# Patient Record
Sex: FEMALE | Race: BLACK OR AFRICAN AMERICAN | Employment: UNEMPLOYED | URBAN - METROPOLITAN AREA
[De-identification: names, ages, dates, MRNs, and addresses within clinical notes are randomized per-mention and may not be internally consistent; named-entity substitution may affect disease eponyms.]

---
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
## 2025-01-05 DIAGNOSIS — E11.65 TYPE 2 DIABETES MELLITUS WITH HYPERGLYCEMIA, WITH LONG-TERM CURRENT USE OF INSULIN (HCC): ICD-10-CM

## 2025-01-05 DIAGNOSIS — Z79.4 TYPE 2 DIABETES MELLITUS WITH HYPERGLYCEMIA, WITH LONG-TERM CURRENT USE OF INSULIN (HCC): ICD-10-CM

## 2025-01-06 RX ORDER — INSULIN GLARGINE 100 [IU]/ML
INJECTION, SOLUTION SUBCUTANEOUS
Qty: 15 ML | Refills: 1 | Status: SHIPPED | OUTPATIENT
Start: 2025-01-06 | End: 2025-01-08

## 2025-01-08 DIAGNOSIS — Z79.4 TYPE 2 DIABETES MELLITUS WITH HYPERGLYCEMIA, WITH LONG-TERM CURRENT USE OF INSULIN (HCC): ICD-10-CM

## 2025-01-08 DIAGNOSIS — E11.65 TYPE 2 DIABETES MELLITUS WITH HYPERGLYCEMIA, WITH LONG-TERM CURRENT USE OF INSULIN (HCC): ICD-10-CM

## 2025-01-08 RX ORDER — INSULIN GLARGINE 100 [IU]/ML
INJECTION, SOLUTION SUBCUTANEOUS
Qty: 15 ML | Refills: 1 | Status: SHIPPED | OUTPATIENT
Start: 2025-01-08

## 2025-02-23 DIAGNOSIS — Z79.4 TYPE 2 DIABETES MELLITUS WITH HYPERGLYCEMIA, WITH LONG-TERM CURRENT USE OF INSULIN (HCC): ICD-10-CM

## 2025-02-23 DIAGNOSIS — E11.65 TYPE 2 DIABETES MELLITUS WITH HYPERGLYCEMIA, WITH LONG-TERM CURRENT USE OF INSULIN (HCC): ICD-10-CM

## 2025-02-24 RX ORDER — URINE ACETONE TEST STRIPS
STRIP MISCELLANEOUS
Qty: 100 STRIP | Refills: 2 | Status: SHIPPED | OUTPATIENT
Start: 2025-02-24 | End: 2025-02-25 | Stop reason: SDUPTHER

## 2025-02-24 RX ORDER — BLOOD SUGAR DIAGNOSTIC
STRIP MISCELLANEOUS
Qty: 200 STRIP | Refills: 2 | Status: SHIPPED | OUTPATIENT
Start: 2025-02-24 | End: 2025-02-25 | Stop reason: SDUPTHER

## 2025-02-25 ENCOUNTER — OFFICE VISIT (OUTPATIENT)
Age: 20
End: 2025-02-25
Payer: COMMERCIAL

## 2025-02-25 VITALS
HEIGHT: 65 IN | RESPIRATION RATE: 17 BRPM | DIASTOLIC BLOOD PRESSURE: 76 MMHG | OXYGEN SATURATION: 98 % | WEIGHT: 168.4 LBS | HEART RATE: 95 BPM | BODY MASS INDEX: 28.06 KG/M2 | TEMPERATURE: 98.1 F | SYSTOLIC BLOOD PRESSURE: 116 MMHG

## 2025-02-25 DIAGNOSIS — E11.65 TYPE 2 DIABETES MELLITUS WITH HYPERGLYCEMIA, WITH LONG-TERM CURRENT USE OF INSULIN (HCC): ICD-10-CM

## 2025-02-25 DIAGNOSIS — Z79.4 TYPE 2 DIABETES MELLITUS WITH HYPERGLYCEMIA, WITH LONG-TERM CURRENT USE OF INSULIN (HCC): ICD-10-CM

## 2025-02-25 DIAGNOSIS — E10.65 TYPE 1 DIABETES MELLITUS WITH HYPERGLYCEMIA (HCC): Primary | ICD-10-CM

## 2025-02-25 PROBLEM — E13.9 DIABETES MELLITUS DUE TO ABNORMAL INSULIN (HCC): Status: RESOLVED | Noted: 2022-02-23 | Resolved: 2025-02-25

## 2025-02-25 PROCEDURE — 99215 OFFICE O/P EST HI 40 MIN: CPT | Performed by: STUDENT IN AN ORGANIZED HEALTH CARE EDUCATION/TRAINING PROGRAM

## 2025-02-25 RX ORDER — BLOOD SUGAR DIAGNOSTIC
STRIP MISCELLANEOUS
Qty: 200 STRIP | Refills: 2 | Status: SHIPPED | OUTPATIENT
Start: 2025-02-25

## 2025-02-25 RX ORDER — URINE ACETONE TEST STRIPS
STRIP MISCELLANEOUS
Qty: 100 STRIP | Refills: 2 | Status: SHIPPED | OUTPATIENT
Start: 2025-02-25

## 2025-02-25 RX ORDER — INSULIN GLARGINE 100 [IU]/ML
INJECTION, SOLUTION SUBCUTANEOUS
Qty: 15 ML | Refills: 1 | Status: SHIPPED | OUTPATIENT
Start: 2025-02-25

## 2025-02-25 NOTE — PATIENT INSTRUCTIONS
Seen for follow for type 1 diabetes.  HbA1c today: due Target is <7.0%.           Plan  Importance of compliance reinforced   BG monitoring at least 4x/day  Send me numbers sarita week to review for any further insulin dose adjustment            Yearly eye exams are recommended   Dental exams every 6 months are recommended  Flu vaccine is recommended every year, as early in the season as possible  Medical ID should be worn at all times  Continue rotating injection/insertion sites  Annual labs are due: ordered today            New insulin regimen  Insulin Instructions  Fixed Dose Injections   Lantus SoloStar 100 UNIT/ML Sopn   Last edited by Marc Barragan MD on 2/25/2025 at 12:03 PM      Time of Day Dose (units)   Bedtime 35     Mealtime Injections   insulin lispro (1 Unit Dial) 100 UNIT/ML Sopn (HUMALOG/ADMELOG)   Last edited by Marc Barragan MD on 2/25/2025 at 12:03 PM      Humalog sliding scale with meals:     8 units  201-300   9 units  301-400   10 units  >400        11 units     Dose mealtime insulin prior to eating.    If low carb meals or no food, give 2 units of Humalog if blood sugar over 200 at mealtime    Continue metformin 1000mg twice daily with meals    Send BG levels via [employer] in 2 weeks

## 2025-02-25 NOTE — PROGRESS NOTES
Chief Complaint   Patient presents with    Follow-up     Patient being seen in clinic today for a follow up appt. Patient states she needs refills.

## 2025-02-25 NOTE — PROGRESS NOTES
[facility] Encounter with William Wheatley for follow up of Type 2 Diabetes- insulin dependent. Accompanied today by mom .      SHABBIR MORAGN RD, [facility]    Start time: 8:36 AM EST  End Time: 8:57 AM    Total time: 21 minutes spent with this clinician      Complete insulin delivery via: Multiple Daily Injections + metformin  Insights from device download: [employer] Meter download    Waking up 60-80 mg/dl many mornings, suggested lower Lantus dose to Dr Barragan    Very High (Above 250 mg/dL): 20 %  High (181-249 mg/dl): 23 %  Time in Range (  mg/dl): 51 %  Low (55- 69 mg/dl): 6 %  Very Low (Below 54 mg/dL): 1 %    TDD/TDI: 65 units       Insulin Instructions  Fixed Dose Injections   Lantus SoloStar 100 UNIT/ML Sopn   Last edited by Marc Barragan MD on 4/8/2024 at 10:18 AM      Time of Day Dose (units)   Bedtime 35     Mealtime Injections   insulin lispro (1 Unit Dial) 100 UNIT/ML Sopn (HUMALOG/ADMELOG)   Last edited by Marc Barragan MD on 8/1/2024 at 12:02 PM      Humalog sliding scale with meals:     8 units  201-300   9 units  301-400   10 units  >400        11 units        [x] Glucagon - GVOKE Reviewed how to use and ensure that they check the expiration date  [x] Ketones - new Rx today; discussed need to use with stress/illness/elevated blood sugar- less than 6 months old if opened. Need for home and school   [x] Injection sites  - POSTERIOR ARM and THIGH ; Rotations of these sites Yes  Signs of Overuse to same area or lipohypertrophy: No  [x] Carb counting skills assessed including resources used - not carb counting but taking 10 units Humalog regardless of BG level      Discussed the need for diabetes go bag that would include all diabetes management supplies, treatment for low.       Diagnosis date: 1/30/2018   Length of diabetes diagnosis: 7 years      DMMP completed: No - graduated, not currently working    Preparing for Adult Transition Checklist: Hypoglycemia    Patient expressed

## 2025-02-25 NOTE — PROGRESS NOTES
4/8/2024 CC: Follow up for type I diabetes [VICTORIA 65 positive] on insulin injection and metformin      Hx of depression/Anxiety    History of present illness:    William is a 19 y.o. 2 m.o. female who [facility] type [facility] diabetes. She was present today with her mum.     William was originally diagnosed with diabetes in 1/30/2018.  Her last visit in diabetes clinic was on 8/1/2024 hemoglobin A1c was 9.3 %. Since then, she has remained well with no intercurrent illnesses, ED visits, or hospitalizations. Denies headache,tiredness, problems with peripheral vision,constipation/diarrhea,heat/cold intolerance,polyuria,polydipsia      Blood glucoses: Meter download shows that she is checking her blood sugars on the average 4.4 times a day.  2-week blood sugar average: 175.  Time in range: 51%, high: 23%, very high: 20%, low: 6%, very low: 1%    Hypoglycemia: Blood 2 times a week.    Severe hypoglycemia requiring glucagon: None  Blood sugars about 300: 2-3 times a week.  Negative ketones.    Insulin regimen:  Insulin Instructions  Fixed Dose Injections   Lantus SoloStar 100 UNIT/ML Sopn   Last edited by Marc Barragan MD on 4/8/2024 at 10:18 AM      Time of Day Dose (units)   Bedtime 35     Mealtime Injections   insulin lispro (1 Unit Dial) 100 UNIT/ML Sopn (HUMALOG/ADMELOG)   Last edited by Marc Barragan MD on 8/1/2024 at 12:02 PM      Humalog sliding scale with meals:     8 units  201-300   9 units  301-400   10 units  >400        11 units          Also on metformin 1000mg BID with meals.       Last Eye exam: 5/6/2021 [no mention of diabetic retinopathy]    Last flu shot: Due    Medical ID: Does not wear  Screening labs:  TSH   Date Value Ref Range Status   11/23/2021 1.47 0.36 - 3.74 uIU/mL Final     Comment:       Due to TSH heterogeneity, both structurally and degree of glycosylation,  monoclonal antibodies used in the TSH assay may not accurately quantitate TSH.  Therefore,

## 2025-04-18 DIAGNOSIS — E11.65 TYPE 2 DIABETES MELLITUS WITH HYPERGLYCEMIA, WITH LONG-TERM CURRENT USE OF INSULIN (HCC): ICD-10-CM

## 2025-04-18 DIAGNOSIS — Z79.4 TYPE 2 DIABETES MELLITUS WITH HYPERGLYCEMIA, WITH LONG-TERM CURRENT USE OF INSULIN (HCC): ICD-10-CM

## 2025-04-18 RX ORDER — INSULIN GLARGINE 100 [IU]/ML
INJECTION, SOLUTION SUBCUTANEOUS
Qty: 15 ML | Refills: 1 | Status: SHIPPED | OUTPATIENT
Start: 2025-04-18

## 2025-04-18 RX ORDER — GLUCAGON INJECTION, SOLUTION 1 MG/.2ML
INJECTION, SOLUTION SUBCUTANEOUS
Qty: 1 EACH | Refills: 0 | Status: SHIPPED | OUTPATIENT
Start: 2025-04-18

## 2025-04-18 RX ORDER — PEN NEEDLE, DIABETIC 32GX 5/32"
NEEDLE, DISPOSABLE MISCELLANEOUS
Qty: 200 EACH | Refills: 3 | OUTPATIENT
Start: 2025-04-18

## 2025-04-18 RX ORDER — INSULIN LISPRO 100 [IU]/ML
INJECTION, SOLUTION INTRAVENOUS; SUBCUTANEOUS
Qty: 15 ML | Refills: 3 | Status: SHIPPED | OUTPATIENT
Start: 2025-04-18

## 2025-04-18 RX ORDER — BISMUTH SUBSALICYLATE 262MG/15ML
SUSPENSION, ORAL (FINAL DOSE FORM) ORAL
Qty: 200 EACH | Refills: 3 | Status: SHIPPED | OUTPATIENT
Start: 2025-04-18

## 2025-04-18 RX ORDER — PEN NEEDLE, DIABETIC 32GX 5/32"
NEEDLE, DISPOSABLE MISCELLANEOUS
Qty: 200 EACH | Refills: 3 | Status: SHIPPED | OUTPATIENT
Start: 2025-04-18

## 2025-04-18 RX ORDER — URINE ACETONE TEST STRIPS
STRIP MISCELLANEOUS
Qty: 100 STRIP | Refills: 2 | Status: SHIPPED | OUTPATIENT
Start: 2025-04-18

## 2025-04-18 RX ORDER — BLOOD SUGAR DIAGNOSTIC
STRIP MISCELLANEOUS
Qty: 200 STRIP | Refills: 2 | Status: SHIPPED | OUTPATIENT
Start: 2025-04-18

## 2025-04-18 NOTE — TELEPHONE ENCOUNTER
Korey Marquez needs refills of the following medications:    blood glucose test strips (ONETOUCH VERIO) strip    metFORMIN (GLUCOPHAGE) 1000 MG tablet    Insulin Pen Needle (BD PEN NEEDLE KAVIN 2ND GEN) 32G X 4 MM MISC    insulin lispro, 1 Unit Dial, (HUMALOG KWIKPEN) 100 UNIT/ML SOPN    insulin glargine (LANTUS SOLOSTAR) 100 UNIT/ML injection pen    Mom says if there are any other medications needing refills or a new prescription call them in because she believes everything needs to be called in to the pharmacy.    Please advise.    Mom [phone]  [facility] [phone]

## 2025-06-13 DIAGNOSIS — Z79.4 TYPE 2 DIABETES MELLITUS WITH HYPERGLYCEMIA, WITH LONG-TERM CURRENT USE OF INSULIN (HCC): ICD-10-CM

## 2025-06-13 DIAGNOSIS — E11.65 TYPE 2 DIABETES MELLITUS WITH HYPERGLYCEMIA, WITH LONG-TERM CURRENT USE OF INSULIN (HCC): ICD-10-CM

## 2025-06-13 RX ORDER — INSULIN LISPRO 100 [IU]/ML
INJECTION, SOLUTION INTRAVENOUS; SUBCUTANEOUS
Qty: 30 ML | Refills: 0 | Status: SHIPPED | OUTPATIENT
Start: 2025-06-13

## 2025-07-28 DIAGNOSIS — Z79.4 TYPE 2 DIABETES MELLITUS WITH HYPERGLYCEMIA, WITH LONG-TERM CURRENT USE OF INSULIN (HCC): ICD-10-CM

## 2025-07-28 DIAGNOSIS — E11.65 TYPE 2 DIABETES MELLITUS WITH HYPERGLYCEMIA, WITH LONG-TERM CURRENT USE OF INSULIN (HCC): ICD-10-CM

## 2025-07-28 RX ORDER — BLOOD SUGAR DIAGNOSTIC
STRIP MISCELLANEOUS
Qty: 200 STRIP | Refills: 0 | Status: SHIPPED | OUTPATIENT
Start: 2025-07-28

## 2025-08-12 ENCOUNTER — TELEPHONE (OUTPATIENT)
Age: 20
End: 2025-08-12

## 2025-08-12 DIAGNOSIS — Z79.4 TYPE 2 DIABETES MELLITUS WITH HYPERGLYCEMIA, WITH LONG-TERM CURRENT USE OF INSULIN (HCC): ICD-10-CM

## 2025-08-12 DIAGNOSIS — E11.65 TYPE 2 DIABETES MELLITUS WITH HYPERGLYCEMIA, WITH LONG-TERM CURRENT USE OF INSULIN (HCC): ICD-10-CM

## 2025-08-18 DIAGNOSIS — Z79.4 TYPE 2 DIABETES MELLITUS WITH HYPERGLYCEMIA, WITH LONG-TERM CURRENT USE OF INSULIN (HCC): ICD-10-CM

## 2025-08-18 DIAGNOSIS — E11.65 TYPE 2 DIABETES MELLITUS WITH HYPERGLYCEMIA, WITH LONG-TERM CURRENT USE OF INSULIN (HCC): ICD-10-CM

## 2025-08-18 RX ORDER — INSULIN GLARGINE 100 [IU]/ML
INJECTION, SOLUTION SUBCUTANEOUS
Qty: 15 ADJUSTABLE DOSE PRE-FILLED PEN SYRINGE | Refills: 0 | Status: SHIPPED | OUTPATIENT
Start: 2025-08-18

## 2025-08-18 RX ORDER — INSULIN LISPRO 100 [IU]/ML
INJECTION, SOLUTION INTRAVENOUS; SUBCUTANEOUS
Qty: 30 ADJUSTABLE DOSE PRE-FILLED PEN SYRINGE | Refills: 0 | Status: SHIPPED | OUTPATIENT
Start: 2025-08-18

## 2025-08-27 DIAGNOSIS — Z79.4 TYPE 2 DIABETES MELLITUS WITH HYPERGLYCEMIA, WITH LONG-TERM CURRENT USE OF INSULIN (HCC): ICD-10-CM

## 2025-08-27 DIAGNOSIS — E11.65 TYPE 2 DIABETES MELLITUS WITH HYPERGLYCEMIA, WITH LONG-TERM CURRENT USE OF INSULIN (HCC): ICD-10-CM

## 2025-08-27 RX ORDER — BLOOD SUGAR DIAGNOSTIC
STRIP MISCELLANEOUS
Qty: 200 STRIP | Refills: 0 | Status: SHIPPED | OUTPATIENT
Start: 2025-08-27 | End: 2025-08-30

## 2025-08-30 ENCOUNTER — TELEPHONE (OUTPATIENT)
Age: 20
End: 2025-08-30

## 2025-08-30 RX ORDER — CALCIUM CITRATE/VITAMIN D3 200MG-6.25
TABLET ORAL
Qty: 200 EACH | Refills: 2 | Status: SHIPPED | OUTPATIENT
Start: 2025-08-30

## 2025-08-30 RX ORDER — BLOOD-GLUCOSE METER
EACH MISCELLANEOUS
Qty: 1 EACH | Refills: 1 | Status: SHIPPED | OUTPATIENT
Start: 2025-08-30